# Patient Record
Sex: MALE | Race: BLACK OR AFRICAN AMERICAN | NOT HISPANIC OR LATINO | Employment: STUDENT | URBAN - METROPOLITAN AREA
[De-identification: names, ages, dates, MRNs, and addresses within clinical notes are randomized per-mention and may not be internally consistent; named-entity substitution may affect disease eponyms.]

---
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
## 2024-03-24 ENCOUNTER — ATHLETIC TRAINING (OUTPATIENT)
Dept: SPORTS MEDICINE | Facility: OTHER | Age: 19
End: 2024-03-24

## 2024-03-24 DIAGNOSIS — S66.114A: Primary | ICD-10-CM

## 2024-03-25 NOTE — PROGRESS NOTES
3/24  A: Right 4th digit strain  S: Pt states that he hit a bag and his finger hyperextended.  O: Limited in extension and flexion  P: Devin taped to return to practice.  GERA ATC

## 2024-04-16 ENCOUNTER — ATHLETIC TRAINING (OUTPATIENT)
Dept: SPORTS MEDICINE | Facility: OTHER | Age: 19
End: 2024-04-16

## 2024-04-16 DIAGNOSIS — S93.492A SPRAIN OF ANTERIOR TALOFIBULAR LIGAMENT OF LEFT ANKLE, INITIAL ENCOUNTER: Primary | ICD-10-CM

## 2024-04-16 NOTE — PROGRESS NOTES
4/15:  S: Pt states that they were diagnosed with an ankle sprain and they have been completing rehabilitation for it. He states that it happened on Sundays contact practice. He has been getting better.   O: Still limited with jumping and they state they have no power  A: Ankle Sprain  P: Bosu ball balance, toe walks, heel walks, toe side shuffles.  CM

## 2024-08-14 ENCOUNTER — ATHLETIC TRAINING (OUTPATIENT)
Dept: SPORTS MEDICINE | Facility: OTHER | Age: 19
End: 2024-08-14

## 2024-08-14 DIAGNOSIS — T67.5XXA HEAT EXHAUSTION, INITIAL ENCOUNTER: Primary | ICD-10-CM

## 2024-08-14 NOTE — PROGRESS NOTES
8/14/24  Brendan came to see AT staff on the sideline during his first football practice today. He asked for his inhaler and took two puffs. Ath returned to the field, and came back to the sideline shortly after with heat related symptoms.The ath vomited and stated he was experiencing dizziness, light headed sensation, and nausea. The ath was sat down and given water and an ice pack for the back of his neck.  Vitals read: 94% and 104bpm   The athlete was then instructed to sit and continue to drink water. After he drank enough, he was then assessed with another pulse ox reading which improved to 96% and 101bpm. He went out for the rest of practice and was monitored during practice. Ath stated he was feeling good and better after practice. He was advised to check in prior to his walk through today.     PM, ATS  BD, ATC

## 2024-08-16 ENCOUNTER — ATHLETIC TRAINING (OUTPATIENT)
Dept: SPORTS MEDICINE | Facility: OTHER | Age: 19
End: 2024-08-16

## 2024-08-16 DIAGNOSIS — T67.9XXD HEAT EXPOSURE, SUBSEQUENT ENCOUNTER: Primary | ICD-10-CM

## 2024-08-16 DIAGNOSIS — T67.5XXD HEAT EXHAUSTION, SUBSEQUENT ENCOUNTER: Primary | ICD-10-CM

## 2024-08-16 DIAGNOSIS — R22.31 LOCALIZED SWELLING ON RIGHT HAND: Primary | ICD-10-CM

## 2024-08-16 NOTE — PROGRESS NOTES
8/16/24  Ath was removed from practice today by ATS due to vomiting. Today was his first day in [facility]. Ath was assessed and determined he was dehydrated and was taken in the locker room to cool down. Water was provided along with an ice bag. Ath's vitals were taken and were 94%, 109bpm. 5 minutes later vitals were retaken and were 98% and 102bpm. Ath stated he felt better and felt that he just needed to cool down. Ath completed the rest of practice and will check in prior to his walk thru. He was educated on hydration and prevention of dehydration/heat illness.     8/15/24  Ath was able to complete his walk thru and full practice on 8/15 with no concerns/issues. He was educated on hydration and prevention of dehydration/heat illness.

## 2024-08-16 NOTE — PROGRESS NOTES
8/16/24  Ath comes in stating he hit his right hand on a helmet yesterday during practice. He presents with swelling along his 2nd metacarpal, no discoloration. He is slightly TTP along the shaft of the 2nd metacarpal. Tuning fork (-). Ath was able to make a complete fist with slight discomfort. Cryotherapy was completed. He will be padded for practice and continue to do cryotherapy.

## 2024-08-18 ENCOUNTER — ATHLETIC TRAINING (OUTPATIENT)
Dept: SPORTS MEDICINE | Facility: OTHER | Age: 19
End: 2024-08-18

## 2024-08-18 DIAGNOSIS — S76.311A STRAIN OF RIGHT HAMSTRING, INITIAL ENCOUNTER: Primary | ICD-10-CM

## 2024-08-18 NOTE — PROGRESS NOTES
AT Evaluation    Assessment/Plan: leaning towards a grade 1 strain/ over stretch of the R hamstring, done with practice for the day and told to get re-evaluated tomorrow before next practice     Subjective: pt CC is R hamstring, during football practice today (8/18) was sprinting to tom the ball and felt a 'pop' in the hamstring area, pain scale at the time of injury was 6/10, now several hours later its a 3/10, type of pain is a 'pulling'     Objective: nothing note worthy about appearence, tender in the belly of hamstring, MMT 4/5

## 2024-08-19 ENCOUNTER — ATHLETIC TRAINING (OUTPATIENT)
Dept: SPORTS MEDICINE | Facility: OTHER | Age: 19
End: 2024-08-19

## 2024-08-19 DIAGNOSIS — S76.311A STRAIN OF RIGHT HAMSTRING, INITIAL ENCOUNTER: Primary | ICD-10-CM

## 2024-08-19 NOTE — PROGRESS NOTES
AT Evaluation     Assessment/Plan: leaning towards a grade 1 strain/ over stretch of the R hamstring, done with practice for the day and told to get re-evaluated tomorrow before next practice      Subjective: pt CC is R hamstring, during football practice today (8/18) was sprinting to tom the ball and felt a 'pop' in the hamstring area, pain scale at the time of injury was 6/10, now several hours later its a 3/10, type of pain is a 'pulling'      Objective: nothing note worthy about appearence, tender in the belly of hamstring, MMT 4/5    8/19:  Athlete comes back to the [facility] to be re-evaluated for his hamstring. He is experiencing no pain with any movements. He has a passed all functional testing is permitted to practice this AM.  VIPUL

## 2024-08-30 ENCOUNTER — ATHLETIC TRAINING (OUTPATIENT)
Dept: SPORTS MEDICINE | Facility: OTHER | Age: 19
End: 2024-08-30

## 2024-08-30 DIAGNOSIS — R11.11 VOMITING WITHOUT NAUSEA, UNSPECIFIED VOMITING TYPE: Primary | ICD-10-CM

## 2024-08-31 ENCOUNTER — ATHLETIC TRAINING (OUTPATIENT)
Dept: SPORTS MEDICINE | Facility: OTHER | Age: 19
End: 2024-08-31

## 2024-08-31 DIAGNOSIS — S70.10XA QUADRICEPS CONTUSION: Primary | ICD-10-CM

## 2024-08-31 DIAGNOSIS — R11.11 VOMITING WITHOUT NAUSEA, UNSPECIFIED VOMITING TYPE: Primary | ICD-10-CM

## 2024-08-31 NOTE — PROGRESS NOTES
Athletic Training Evaluation    Name: Bruce Borja  Age: 19 y.o.   School District: [employer]  Sport: Football  Date of Assessment: 8/30/2024    Assessment/Plan:     Visit Diagnosis: Vomiting without nausea, unspecified vomiting type [R11.11]    Treatment Plan:     []  Follow-up PRN.   [x]  Follow-up prior to next practice/game for re-evaluation.  []  Daily treatment/rehab. Progress note expected weekly.     Referral:     []  Not needed at this time  []  Referred to:     [x]  Coaching staff notified  []  Parent/Guardian Notified    Subjective: Pt was participating in his first srimmage of the year. He comes off the sideline during a defensive possession and vomits. He states he was unable to catch his breath. He also states that this happens often when he is unable to catch his breath.     Objective:   Pt is able to communicate with me and seems relatively fine. Denies any hit to the head. I can confirm that he vomits regularly. This is the first time I have seen it this year. He took 2 puffs of his albuterol after vomiting. During the next possession, he did vomit again. He was held out from participation after the second time he vomited.     He states he is going to communicate with his PCP about his medication because he states it does not benefit him.    He will check in us tomorrow.  VIPUL

## 2024-08-31 NOTE — PROGRESS NOTES
8/31/24  Ath comes in during open clinic c/o right quad pain. He states yesterday during his scrimmage another player's knee hit his right quad.   Slight TTP VMO and rectus femoris   Full ROM   Slight discomfort with seated knee flexion   Treatment: Pre-mod and ice.    Ath advised to not stretch muscle area, and will f/u tomorrow.

## 2024-09-20 NOTE — PROGRESS NOTES
Athletic Training Evaluation     Name: Bruce Borja  Age: 19 y.o.   School District: [employer]  Sport: Football  Date of Assessment: 8/30/2024     Assessment/Plan:      Visit Diagnosis: Vomiting without nausea, unspecified vomiting type [R11.11]     Treatment Plan:      []  Follow-up PRN.   [x]  Follow-up prior to next practice/game for re-evaluation.  []  Daily treatment/rehab. Progress note expected weekly.      Referral:      []  Not needed at this time  []  Referred to:      [x]  Coaching staff notified  []  Parent/Guardian Notified     Subjective: Pt was participating in his first srimmage of the year. He comes off the sideline during a defensive possession and vomits. He states he was unable to catch his breath. He also states that this happens often when he is unable to catch his breath.      Objective:   Pt is able to communicate with me and seems relatively fine. Denies any hit to the head. I can confirm that he vomits regularly. This is the first time I have seen it this year. He took 2 puffs of his albuterol after vomiting. During the next possession, he did vomit again. He was held out from participation after the second time he vomited.      He states he is going to communicate with his PCP about his medication because he states it does not benefit him.     He will check in us tomorrow.  VIPUL    8/31:  Pt has had no further vomiting episodes and participates in practices fully. This is considered discharged.  VIPUL

## 2024-09-26 ENCOUNTER — ATHLETIC TRAINING (OUTPATIENT)
Dept: SPORTS MEDICINE | Facility: OTHER | Age: 19
End: 2024-09-26

## 2024-09-26 DIAGNOSIS — S06.0X0A CONCUSSION WITHOUT LOSS OF CONSCIOUSNESS, INITIAL ENCOUNTER: Primary | ICD-10-CM

## 2024-09-26 NOTE — PROGRESS NOTES
"Athletic Training Head Injury Evaluation     Name: Bruce Borja  Age: 19 y.o.   Sport: Football     Assessment/Plan:  Visit Diagnosis: Concussion without LOC     Treatment Plan:   []  Follow-up PRN.   []  Follow-up prior to next practice/game for re-evaluation.  [x]  Daily treatment/rehab. Progress note expected weekly.      Referral:   []  Not needed at this time  []  Will re-evaluate tomorrow to determine if referral is needed  [x]  Referred to: Dr. Serra     Subjective:  Date of Assessment: 9/26/2024  Date of Injury: 9/25/24     History: Ath was performing a defensive drill yesterday during practice and remembers taking one hard hit. He reports that he had a headache after this hit and wanted to \"wait it out\". He woke up this morning and did not have a headache, but when he went to class symptoms started to begin, so he came to the AT room.      How many diagnosed concussions has the athlete had in the past?: 2     When was the most recent concussion?: 2023 football season     How long was the recovery from the most recent concussion?: 7 days      Has the athlete ever been: Yes No   Hospitalized for a head injury? [] [x]   Diagnosed/treated for headache disorder or migraines? [] [x]   Diagnosed with a learning disability/dyslexia? [] [x]   Diagnosed with ADD/ADHD [] [x]   Diagnosed with depression, anxiety, or other psychiatric disorder? [] [x]            Symptom Evaluation     0 = No Symptoms; 1 or 2 = Mild Symptoms; 3 or 4 = Moderate Symptoms, 5 or 6 = Severe Symptoms       (Insert Columns as needed for subsequent dates)  Date: 9/26/2024   Symptom Symptom Score   Headache 4    Pressure in head  2   Neck Pain  0   Nausea or vomiting  0   Dizziness  1   Blurred Vision  0   Balance Problems  0   Sensitivity to light  4   Sensitivity to noise  2   Feeling slowed down  0   Feeling like \"in a fog\"  0   Don't feel right  1   Difficulty concentrating  1   Difficulty remembering  0   Fatigue or low energy  0 "   Confusion  0   Drowsiness  2   More emotional 0    Irritability  0   Sadness  0   Nervous or Anxious  0   Trouble falling asleep (if applicable)  0   Total number of Symptoms (of 22): 8    Symptom Severity Score (of 132):  17/132   Do your symptoms get worse with physical activity? unsure    Do your symptoms get worse with mental activity? no          If 100% is feeling perfectly normal, what percent of normal do you feel?: 70%     If not 100%, why?: Headache and pressure in head      Cognitive Screening     Orientation 0 1   What month is it? [] [x]   What is the date today? [] [x]   What is the day of the week? [] [x]   What year is it? [] [x]   What time is it right now? (Within 1 hour) [] [x]   Orientation Score  5 of 5      Immediate Memory: 21/30 on SCAT 6                                                                                                   Score (of 5)  List 5 Word Lists Trial 1 Trial 2 Trial 3   [] Finger, [place], [place], Lemon, Insect         [] Candle, Paper, Sugar, [place], Wagon         [] Baby, Money, Perfume, [place], Iron         [] Elbow, Apple, Carpet, Saddle, Bubble         [] Jacket, Arrow, Pepper, Cotton, Movie         [] Dollar, Honey, Mirror, Saddle, [place]         Immediate Memory Score   of 15      Concentration      Digits Backwards   [] [] [x] Yes No 0/1   4-9-3 5-2-6 1-4-2 [x] [] 1    6-2-9 4-1-5 6-5-8 [x] []    3-8-1-4 1-7-9-5 6-8-3-1 [x] []  1   3-2-7-9 4-9-6-8 3-4-8-1 [x] []    6-2-9-7-1 4-8-5-2-7 4-9-1-5-3 [] [x]     1-5-2-8-6 6-1-8-4-3 6-8-2-5-1 [x] []    7-1-8-4-6-2 8-3-1-9-6-4 3-7-6-5-1-9 [] [x]     5-3-9-1-4-8 7-2-4-8-5-6 9-2-6-5-1-4 [] [x]    Digits Backwards Score  2 of 4   Months in Reverse Order  0 1   Dec-Nov-Oct-Sept-Aug-July-Jun-May-Apr-Mar-Feb-Jan [x] []   Month Score  1 of 1   Concentration Total Score (Digits + Months)   of 5      Neurological Screen    Yes No   Can the patient read aloud (e.g. symptom check-list) and follow instructions without difficulty?  [] []   Does the patient have a full pain-free PASSIVE cervical spine movement? [] []   Without moving their head or neck, can the patient look side-to-side and up-and-down without double vision? [] []   Can the patient perform the finger nose coordination test normally? [] []   Can the patient perform tandem gait normally? [] []      Balance Examination  Modified Balance Error Scoring System (mBESS) testing     Which foot was tested (i.e. which is the non-dominant foot):  [x]  Left  []  Right     Testing surface (hard floor, field, etc.): Floor     Footwear (shoes, barefoot, braces, tape, etc.): Shoes     Condition Errors   Double leg stance  0 of 10   Single leg stance (non-dominant foot)  1 of 10   Tandem stance (non-dominant foot at back)  3 of 10   Total Errors  4 of 30      Delayed Recall     Total number of words recalled accurately   of 5       Vestibular Ocular Motor Screen     Test/Symptoms Headache  (0-10) Dizziness  (0-10) Nausea  (0-10) Fogginess   (0-10)   Starting Symptoms (0-10)           Smooth Pursuits           Saccades - Horizontal           Saccades - Vertical           Convergence           VOR - Horizontal           VOR - Vertical           Visual Motion Sensitivity Test           Comments (if applicable):     ImPACT test completed and uploaded to patient's file.

## 2024-10-03 ENCOUNTER — OFFICE VISIT (OUTPATIENT)
Age: 19
End: 2024-10-03
Payer: COMMERCIAL

## 2024-10-03 VITALS
HEART RATE: 55 BPM | BODY MASS INDEX: 25.2 KG/M2 | HEIGHT: 71 IN | SYSTOLIC BLOOD PRESSURE: 146 MMHG | WEIGHT: 180 LBS | DIASTOLIC BLOOD PRESSURE: 74 MMHG
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
## 2024-10-03 DIAGNOSIS — S06.0X0A CONCUSSION WITHOUT LOSS OF CONSCIOUSNESS, INITIAL ENCOUNTER: Primary | ICD-10-CM

## 2024-10-03 PROCEDURE — 99204 OFFICE O/P NEW MOD 45 MIN: CPT | Performed by: STUDENT IN AN ORGANIZED HEALTH CARE EDUCATION/TRAINING PROGRAM

## 2024-10-03 PROCEDURE — 96132 NRPSYC TST EVAL PHYS/QHP 1ST: CPT | Performed by: STUDENT IN AN ORGANIZED HEALTH CARE EDUCATION/TRAINING PROGRAM

## 2024-10-03 RX ORDER — EPINEPHRINE 0.3 MG/.3ML
0.3 INJECTION SUBCUTANEOUS DAILY PRN
COMMUNITY
Start: 2024-07-03

## 2024-10-03 RX ORDER — CETIRIZINE HYDROCHLORIDE 10 MG/1
1 TABLET ORAL DAILY
COMMUNITY
Start: 2024-09-27 | End: 2025-09-27

## 2024-10-03 RX ORDER — ALBUTEROL SULFATE 90 UG/1
2 INHALANT RESPIRATORY (INHALATION) EVERY 4 HOURS PRN
COMMUNITY

## 2024-10-03 NOTE — PROGRESS NOTES
"Chief Complaint: head injury, concussion evaluation    HPI:       Patient ID:  Bruce Borja is a 19 y.o. male    [employer]  School Status: Back in school full-time    Injury Description:  Date / Time: 9/25/2024  :  Patient  Injury Description: During a defensive drill he remembers taking a \"hard hit\" during practice from helmet to helmet collision frontally.  Patient noticed towards the end of the day and following day he was developing headaches when in class  Evidence of forcible blow to the head:  no  Evidence of IC Injury / Fracture:  no  Location:  Frontal    Amnesia:   Retrograde:  no   Anterograde:  no   LOC:  no  Early Signs:  Dizziness, Headache, and light and noise sensitivity  Seizures:  No  CT Scan:  No   History of Headaches at baseline: Denies  History of Concussion:  Yes.   How many?  2, How long to recover? 1-2 weeks, and Last concussion?  2023     Headache History:  Yes:   Location:   Frontal, Radiation:   None, Pain - quality:   Throbbing, Onset mode:   Gradual, Timing:   Intermittent, Current symptom frequency:   Daily, Current symptom duration:   n/a, Severity:   Mild, Progression:   Improving, Exacerbating factors:   Cognitive activity, Relieving factors:   Rest, Non-opioid analgesics, and had been taking OTC tylenol/advil with relief but stopped taking over past several days as headaches were improving, Associated Symptoms:   None, Current treatment:   Restricted activity, and Symptom control:   Good  Family History of Headache:  No  Developmental History:  Denies h/o ADHD/ADD  History of Sleep Disorder:  No  Psychiatric History:  Denies h/o anxiety/depression  Do symptoms worsen with Physical Activity?  No  Do symptoms worsen with Cognitive Activity?  No  Overall Rating:  What percent is this person back to normal?  Patient 99 %      The following portions of the patient's history were reviewed and updated as appropriate: allergies, current medications, " past family history, past medical history, past social history, past surgical history, and problem list.    Does patient have history of mood disorder or report significant mood associated symptoms? No    PHQ-A Screening                   Symptoms Checklist      Flowsheet Row Most Recent Value   Physical    Headache 1   Nausea 0   Vomiting 0   Balance problems 0   Dizziness 0   Visual problems 0   Fatigue 0   Sensitivity to light 0   Sensitivity to noise 0   Numbness / tingling 0   TOTAL PHYSICAL SCORE 1   Cognitive    Foggy 0   Slowed down 0   Difficulty concentrating 0   Difficulty remembering 0   TOTAL COGNITIVE SCORE 0   Emotional    Irritability 0   Sadness 0   More emotional 0   Nervousness 0   TOTAL EMOTIONAL SCORE 0   Sleep    Drowsiness 0   Sleeping less 0   Sleeping more 0   Difficulty falling asleep 0   TOTAL SLEEP SCORE 0   TOTAL SYMPTOM SCORE 1              I have personally reviewed pertinent films in PACS.    No recent relevant imaging    There is no problem list on file for this patient.       Current Outpatient Medications on File Prior to Visit   Medication Sig Dispense Refill    albuterol (PROVENTIL HFA,VENTOLIN HFA) 90 mcg/act inhaler Inhale 2 puffs every 4 (four) hours as needed      cetirizine (ZyrTEC) 10 mg tablet Take 1 tablet by mouth daily      EPINEPHrine (EPIPEN) 0.3 mg/0.3 mL SOAJ Inject 0.3 mg into a muscle Once daily as needed      Saline Spray 0.65 % SOLN 1 spray into each nostril       No current facility-administered medications on file prior to visit.        Allergies   Allergen Reactions    Peanut Butter Flavor - Food Allergy Anaphylaxis, Headache, Shortness Of Breath and Swelling    Peanut-Containing Drug Products - Food Allergy Anaphylaxis    Shellfish-Derived Products - Food Allergy Anaphylaxis, Headache, Hives, Shortness Of Breath and Swelling    Grass Pollen(K-O-R-T-Swt Rajan) Hives and Itching    Pollen Extract Other (See Comments) and Nasal Congestion     Itchy eyes         Tobacco Use: Low Risk  (10/3/2024)    Patient History     Smoking Tobacco Use: Never     Smokeless Tobacco Use: Never     Passive Exposure: Never        Social Determinants of Health     Tobacco Use: Low Risk  (10/3/2024)    Patient History     Smoking Tobacco Use: Never     Smokeless Tobacco Use: Never     Passive Exposure: Never   Alcohol Use: Not At Risk (9/27/2024)    Received from [facility]    AUDIT-C     Frequency of Alcohol Consumption: Never     Average Number of Drinks: Patient does not drink     Frequency of Binge Drinking: Never   Financial Resource Strain: Not on file   Food Insecurity: No Food Insecurity (9/27/2024)    Received from [facility]    Hunger Vital Sign     Worried About Running Out of Food in the Last Year: Never true     Ran Out of Food in the Last Year: Never true   Transportation Needs: No Transportation Needs (7/3/2024)    Received from [facility]    PRAPARE - Transportation     Lack of Transportation (Medical): No     Lack of Transportation (Non-Medical): No   Physical Activity: Sufficiently Active (7/3/2024)    Received from [facility]    Exercise Vital Sign     Days of Exercise per Week: 5 days     Minutes of Exercise per Session: 120 min   Stress: Not on file   Social Connections: Not on file   Intimate Partner Violence: Not At Risk (9/27/2024)    Received from [facility]    Intimate Partner Violence     Within the last year, have you been afraid of your partner, ex-partner or family member?: 2     Within the last year, have you been humiliated or emotionally abused in other ways by your partner, ex-partner or family member?: 2     Within the last year, have you been kicked, hit, slapped, or otherwise physically hurt by your partner, ex-partner or family member?: 2     Within the last year, have you been raped or forced to have any kind of sexual activity by your partner, ex-partner or family member?: 2  "  Depression: Not at risk (7/3/2024)    Received from [facility]    Depression     PHQ-9 Total Score: 0   Housing Stability: Not on file   Utilities: Not on file   Health Literacy: Not on file        Review of Systems     Body mass index is 25.1 kg/m².     Physical Exam     Physical Exam       /74   Pulse 55   Ht 5' 11\" (1.803 m)   Wt 81.6 kg (180 lb)   BMI 25.10 kg/m²   General:   NAD:  Yes  Psych:   AAOX3:  Yes   Mood and Affect:  Normal  HEENT:   Lacerations:  No   Bruising:  No   PEERLA:  Yes     Neuro:   Examination of Coordination:  Abnormal:   Limited Balance:   No, Past Pointing:   Normal, Single Leg Stance:   Abnormal.  Explain:  0 errors eyes open, 3 errors eyes closed, Forward Tandem Gait:   Normal, Backward Tandem Gait:   Normal, Eyes Close Tandem Gait:   Normal, Dysdiadochokinesia:   Bilateral:   No, and Finger - Nose Impaired:   Bilateral:   No   CNII - XII Intact:  Yes   FTN:  Normal   Accommodation:  5cm b/l (corrected w/ contacts)   Convergence:  5cm    Vestibular Ocular:  Gaze stability:  Normal           ImPACT Neurocognitive Test Interpretation:  Date of testin2024  Place of testing: [facility]  Baseline test available and valid?  Yes    Composite Score Percentile Value Comparable to baseline   Memory Composite (verbal) 82 Yes   Visual Motor Speed Composite: 73 No   Reaction Time Composite 52 Yes   Impulse control composite 8 Yes     Total Symptom Score: 10    Significant symptom worsening post-test ? Yes    Clinically correlated ImPACT neurocognitive scores appear comparable to baseline/ normative data? No    I spent a minimum of 31 minutes coordinating patient's  to conduct IMPACT test along with time analyzing, interpreting, and explaining the results of patient's IMPACT test on today's visit.     Assessment:     Diagnosis ICD-10-CM Associated Orders   1. Concussion without loss of consciousness, initial encounter  S06.0X0A       2. " "Injury while playing American football  Y93.61           Plan:     I explained my current clinical findings to Bruce Borja  . We had a detailed discussion with regards to pathophysiology of a concussion injury along with its immediate, short-term and long-term complications.      1. Physical activity - Light aerobics as tolerated including walking, jogging, and stationary biking provided does not worsen headaches. Counseled patient when she is headache free for at least 24 hours and not taking any pain medications and is not requiring school accommodations that she can progressively return back to sports through a return to play protocol conducted by her athletic training team. Repeat IMPACT when transitioning from step 3 to 4     2. Cognitive / academic activity - declined need for accommodations     3. Symptomatic treatment - acetaminophen/nsaids for headaches     4. Other management - as per patient instructions     5. Referrals made - none        Follow-Up:    As needed        Portions of the record may have been created with voice recognition software. Occasional wrong word or \"sound alike\" substitutions may have occurred due to the inherent limitations of voice recognition software. Please review the chart carefully and recognize, using context, where substitutions/typographical errors may have occurred.          "

## 2024-10-07 ENCOUNTER — ATHLETIC TRAINING (OUTPATIENT)
Dept: SPORTS MEDICINE | Facility: OTHER | Age: 19
End: 2024-10-07

## 2024-10-07 DIAGNOSIS — S06.0X0A CONCUSSION WITHOUT LOSS OF CONSCIOUSNESS, INITIAL ENCOUNTER: Primary | ICD-10-CM

## 2024-10-07 NOTE — PROGRESS NOTES
10/6  Ath returns from his weekend at home. He reports he was symptom free over the weekend.     Today he completed 15min on the bike; Day 2a  Pre bike: 0 symptoms, 100%  Post bike: 2 symptoms, 99%    He will repeat Day 2a tomorrow.

## 2024-10-16 ENCOUNTER — ATHLETIC TRAINING (OUTPATIENT)
Dept: SPORTS MEDICINE | Facility: OTHER | Age: 19
End: 2024-10-16

## 2024-10-16 DIAGNOSIS — S06.0X0A CONCUSSION WITHOUT LOSS OF CONSCIOUSNESS, INITIAL ENCOUNTER: Primary | ICD-10-CM

## 2024-10-16 NOTE — PROGRESS NOTES
10/16  Ath has re-impacted and given the clearance from Dr. Serra to continue progression. He will continue through the rest of his RTP. Today he will be completing Day 6.     10/6  Brendan returns from his weekend at home. He reports he was symptom free over the weekend.     Today he completed 15min on the bike; Day 2a  Pre bike: 0 symptoms, 100%  Post bike: 2 symptoms, 99%    He will repeat Day 2a tomorrow.

## 2025-08-13 ENCOUNTER — ATHLETIC TRAINING (OUTPATIENT)
Dept: SPORTS MEDICINE | Facility: OTHER | Age: 20
End: 2025-08-13

## 2025-08-14 ENCOUNTER — ATHLETIC TRAINING (OUTPATIENT)
Dept: SPORTS MEDICINE | Facility: OTHER | Age: 20
End: 2025-08-14